# Patient Record
Sex: FEMALE | Race: WHITE | NOT HISPANIC OR LATINO | Employment: FULL TIME | URBAN - METROPOLITAN AREA
[De-identification: names, ages, dates, MRNs, and addresses within clinical notes are randomized per-mention and may not be internally consistent; named-entity substitution may affect disease eponyms.]

---

## 2017-06-22 ENCOUNTER — HOSPITAL ENCOUNTER (OUTPATIENT)
Dept: RADIOLOGY | Facility: MEDICAL CENTER | Age: 57
End: 2017-06-22
Attending: FAMILY MEDICINE
Payer: COMMERCIAL

## 2017-06-22 DIAGNOSIS — M81.0 AGE-RELATED OSTEOPOROSIS WITHOUT CURRENT PATHOLOGICAL FRACTURE: ICD-10-CM

## 2017-06-22 PROCEDURE — 77080 DXA BONE DENSITY AXIAL: CPT

## 2017-07-05 ENCOUNTER — HOSPITAL ENCOUNTER (OUTPATIENT)
Dept: RADIOLOGY | Facility: MEDICAL CENTER | Age: 57
End: 2017-07-05

## 2017-07-20 ENCOUNTER — HOSPITAL ENCOUNTER (OUTPATIENT)
Dept: RADIOLOGY | Facility: MEDICAL CENTER | Age: 57
End: 2017-07-20
Attending: OBSTETRICS & GYNECOLOGY
Payer: COMMERCIAL

## 2017-07-20 DIAGNOSIS — Z12.31 SCREENING MAMMOGRAM, ENCOUNTER FOR: ICD-10-CM

## 2017-07-20 PROCEDURE — G0202 SCR MAMMO BI INCL CAD: HCPCS

## 2017-09-06 ENCOUNTER — NON-PROVIDER VISIT (OUTPATIENT)
Dept: URGENT CARE | Facility: CLINIC | Age: 57
End: 2017-09-06
Payer: COMMERCIAL

## 2017-09-06 ENCOUNTER — NON-PROVIDER VISIT (OUTPATIENT)
Dept: OCCUPATIONAL MEDICINE | Facility: CLINIC | Age: 57
End: 2017-09-06

## 2017-09-06 DIAGNOSIS — Z23 ENCOUNTER FOR IMMUNIZATION: ICD-10-CM

## 2017-09-06 PROCEDURE — 90686 IIV4 VACC NO PRSV 0.5 ML IM: CPT | Performed by: PREVENTIVE MEDICINE

## 2017-09-06 PROCEDURE — 90471 IMMUNIZATION ADMIN: CPT | Performed by: PREVENTIVE MEDICINE

## 2017-09-08 VITALS
OXYGEN SATURATION: 96 % | HEART RATE: 52 BPM | TEMPERATURE: 98.6 F | WEIGHT: 169.2 LBS | DIASTOLIC BLOOD PRESSURE: 62 MMHG | SYSTOLIC BLOOD PRESSURE: 138 MMHG

## 2017-09-08 NOTE — PROGRESS NOTES
Travel dates:10/7/17-10/21/17  Countries to be visited: [place], [place]  Reason for travel: pleasure    Rural travel: not likely  High altitude travel: yes    Accommodations:  Hotel: yes   Hostel:  Camping:  Cruise: yes  With family:  Other:    Vaccines in past 30 days? No  Sick today? No  Allergies: pcn    The screening intake form was reviewed with the traveler. Health risks associated with their travel plans have been reviewed and discussed with the traveler. The traveler has been provided with vaccine information statements for the vaccines that are recommended and given the opportunity to discuss risks and benefits of vaccination and or medications. The traveler has received education on the travel itinerary provided and on the following topics.    Personal safety precautions: Yes  Food and water precautions: Yes  Management of traveler's diarrhea: Yes  Mosquito/insect bite prevention:Yes  Animal bites/Rabies prevention: No  High altitude precautions: yes      RN comments:   Hep A vaccine recommended. Pt declined, and stated she will receive it elsewhere for insurance coverage.   Flu vaccine given through urgent Care            Physician consultation required: no

## 2018-07-17 ENCOUNTER — APPOINTMENT (RX ONLY)
Dept: URBAN - METROPOLITAN AREA CLINIC 4 | Facility: CLINIC | Age: 58
Setting detail: DERMATOLOGY
End: 2018-07-17

## 2018-07-17 DIAGNOSIS — L82.1 OTHER SEBORRHEIC KERATOSIS: ICD-10-CM

## 2018-07-17 DIAGNOSIS — L60.3 NAIL DYSTROPHY: ICD-10-CM

## 2018-07-17 DIAGNOSIS — L72.0 EPIDERMAL CYST: ICD-10-CM

## 2018-07-17 PROCEDURE — 99203 OFFICE O/P NEW LOW 30 MIN: CPT

## 2018-07-17 PROCEDURE — ? COUNSELING

## 2018-07-17 ASSESSMENT — LOCATION SIMPLE DESCRIPTION DERM
LOCATION SIMPLE: NOSE
LOCATION SIMPLE: LEFT CALF
LOCATION SIMPLE: LEFT RING FINGERNAIL

## 2018-07-17 ASSESSMENT — LOCATION DETAILED DESCRIPTION DERM
LOCATION DETAILED: LEFT RING FINGERNAIL
LOCATION DETAILED: LEFT DISTAL MEDIAL CALF
LOCATION DETAILED: NASAL DORSUM

## 2018-07-17 ASSESSMENT — LOCATION ZONE DERM
LOCATION ZONE: FINGERNAIL
LOCATION ZONE: LEG
LOCATION ZONE: NOSE

## 2018-07-19 ENCOUNTER — HOSPITAL ENCOUNTER (OUTPATIENT)
Dept: RADIOLOGY | Facility: MEDICAL CENTER | Age: 58
End: 2018-07-19
Attending: OBSTETRICS & GYNECOLOGY
Payer: COMMERCIAL

## 2018-07-19 DIAGNOSIS — Z12.31 VISIT FOR SCREENING MAMMOGRAM: ICD-10-CM

## 2018-07-19 PROCEDURE — 77067 SCR MAMMO BI INCL CAD: CPT

## 2018-11-27 ENCOUNTER — APPOINTMENT (OUTPATIENT)
Dept: ADMISSIONS | Facility: MEDICAL CENTER | Age: 58
End: 2018-11-27
Attending: ORTHOPAEDIC SURGERY
Payer: COMMERCIAL

## 2018-11-27 RX ORDER — IBUPROFEN 200 MG
200 TABLET ORAL EVERY 6 HOURS PRN
COMMUNITY
End: 2019-02-05

## 2018-11-27 RX ORDER — ASCORBIC ACID 500 MG
500 TABLET ORAL DAILY
COMMUNITY
End: 2019-02-05

## 2018-11-29 ENCOUNTER — HOSPITAL ENCOUNTER (OUTPATIENT)
Facility: MEDICAL CENTER | Age: 58
End: 2018-11-29
Attending: ORTHOPAEDIC SURGERY | Admitting: ORTHOPAEDIC SURGERY
Payer: COMMERCIAL

## 2018-11-29 VITALS
OXYGEN SATURATION: 92 % | HEIGHT: 67 IN | DIASTOLIC BLOOD PRESSURE: 72 MMHG | HEART RATE: 80 BPM | BODY MASS INDEX: 24.01 KG/M2 | WEIGHT: 153 LBS | TEMPERATURE: 97.2 F | SYSTOLIC BLOOD PRESSURE: 126 MMHG | RESPIRATION RATE: 14 BRPM

## 2018-11-29 PROCEDURE — 160046 HCHG PACU - 1ST 60 MINS PHASE II: Performed by: ORTHOPAEDIC SURGERY

## 2018-11-29 PROCEDURE — 501838 HCHG SUTURE GENERAL: Performed by: ORTHOPAEDIC SURGERY

## 2018-11-29 PROCEDURE — 160029 HCHG SURGERY MINUTES - 1ST 30 MINS LEVEL 4: Performed by: ORTHOPAEDIC SURGERY

## 2018-11-29 PROCEDURE — 160048 HCHG OR STATISTICAL LEVEL 1-5: Performed by: ORTHOPAEDIC SURGERY

## 2018-11-29 PROCEDURE — A9270 NON-COVERED ITEM OR SERVICE: HCPCS | Performed by: ANESTHESIOLOGY

## 2018-11-29 PROCEDURE — 700101 HCHG RX REV CODE 250

## 2018-11-29 PROCEDURE — 502000 HCHG MISC OR IMPLANTS RC 0278: Performed by: ORTHOPAEDIC SURGERY

## 2018-11-29 PROCEDURE — 700111 HCHG RX REV CODE 636 W/ 250 OVERRIDE (IP)

## 2018-11-29 PROCEDURE — 500144 HCHG CANNULA KIT, UNIV GREY-SHOULDER: Performed by: ORTHOPAEDIC SURGERY

## 2018-11-29 PROCEDURE — 160002 HCHG RECOVERY MINUTES (STAT): Performed by: ORTHOPAEDIC SURGERY

## 2018-11-29 PROCEDURE — 160025 RECOVERY II MINUTES (STATS): Performed by: ORTHOPAEDIC SURGERY

## 2018-11-29 PROCEDURE — 700111 HCHG RX REV CODE 636 W/ 250 OVERRIDE (IP): Performed by: ANESTHESIOLOGY

## 2018-11-29 PROCEDURE — 160009 HCHG ANES TIME/MIN: Performed by: ORTHOPAEDIC SURGERY

## 2018-11-29 PROCEDURE — 160041 HCHG SURGERY MINUTES - EA ADDL 1 MIN LEVEL 4: Performed by: ORTHOPAEDIC SURGERY

## 2018-11-29 PROCEDURE — 500151 HCHG CANNULA, THRDED 8.4: Performed by: ORTHOPAEDIC SURGERY

## 2018-11-29 PROCEDURE — 501835 HCHG SUTURE PLASTIC: Performed by: ORTHOPAEDIC SURGERY

## 2018-11-29 PROCEDURE — 160022 HCHG BLOCK: Performed by: ORTHOPAEDIC SURGERY

## 2018-11-29 PROCEDURE — 502581 HCHG PACK, SHOULDER ARTHROSCOPY: Performed by: ORTHOPAEDIC SURGERY

## 2018-11-29 PROCEDURE — 160035 HCHG PACU - 1ST 60 MINS PHASE I: Performed by: ORTHOPAEDIC SURGERY

## 2018-11-29 PROCEDURE — 160036 HCHG PACU - EA ADDL 30 MINS PHASE I: Performed by: ORTHOPAEDIC SURGERY

## 2018-11-29 PROCEDURE — 500028 HCHG ARTHROWAND TURBOVAC 3.5/90 SUCT.: Performed by: ORTHOPAEDIC SURGERY

## 2018-11-29 PROCEDURE — 700102 HCHG RX REV CODE 250 W/ 637 OVERRIDE(OP): Performed by: ANESTHESIOLOGY

## 2018-11-29 DEVICE — IMPLANTABLE DEVICE: Type: IMPLANTABLE DEVICE | Site: SHOULDER | Status: FUNCTIONAL

## 2018-11-29 RX ORDER — OXYCODONE HCL 5 MG/5 ML
5 SOLUTION, ORAL ORAL
Status: COMPLETED | OUTPATIENT
Start: 2018-11-29 | End: 2018-11-29

## 2018-11-29 RX ORDER — OXYCODONE HCL 5 MG/5 ML
10 SOLUTION, ORAL ORAL
Status: COMPLETED | OUTPATIENT
Start: 2018-11-29 | End: 2018-11-29

## 2018-11-29 RX ORDER — HYDROMORPHONE HYDROCHLORIDE 1 MG/ML
0.2 INJECTION, SOLUTION INTRAMUSCULAR; INTRAVENOUS; SUBCUTANEOUS
Status: DISCONTINUED | OUTPATIENT
Start: 2018-11-29 | End: 2018-11-29 | Stop reason: HOSPADM

## 2018-11-29 RX ORDER — HYDROMORPHONE HYDROCHLORIDE 1 MG/ML
0.1 INJECTION, SOLUTION INTRAMUSCULAR; INTRAVENOUS; SUBCUTANEOUS
Status: DISCONTINUED | OUTPATIENT
Start: 2018-11-29 | End: 2018-11-29 | Stop reason: HOSPADM

## 2018-11-29 RX ORDER — HYDROMORPHONE HYDROCHLORIDE 1 MG/ML
0.4 INJECTION, SOLUTION INTRAMUSCULAR; INTRAVENOUS; SUBCUTANEOUS
Status: DISCONTINUED | OUTPATIENT
Start: 2018-11-29 | End: 2018-11-29 | Stop reason: HOSPADM

## 2018-11-29 RX ORDER — SODIUM CHLORIDE, SODIUM LACTATE, POTASSIUM CHLORIDE, CALCIUM CHLORIDE 600; 310; 30; 20 MG/100ML; MG/100ML; MG/100ML; MG/100ML
INJECTION, SOLUTION INTRAVENOUS ONCE
Status: COMPLETED | OUTPATIENT
Start: 2018-11-29 | End: 2018-11-29

## 2018-11-29 RX ORDER — LIDOCAINE HYDROCHLORIDE AND EPINEPHRINE 10; 10 MG/ML; UG/ML
INJECTION, SOLUTION INFILTRATION; PERINEURAL
Status: DISCONTINUED | OUTPATIENT
Start: 2018-11-29 | End: 2018-11-29 | Stop reason: HOSPADM

## 2018-11-29 RX ORDER — MEPERIDINE HYDROCHLORIDE 25 MG/ML
12.5 INJECTION INTRAMUSCULAR; INTRAVENOUS; SUBCUTANEOUS
Status: DISCONTINUED | OUTPATIENT
Start: 2018-11-29 | End: 2018-11-29 | Stop reason: HOSPADM

## 2018-11-29 RX ORDER — HALOPERIDOL 5 MG/ML
1 INJECTION INTRAMUSCULAR
Status: DISCONTINUED | OUTPATIENT
Start: 2018-11-29 | End: 2018-11-29 | Stop reason: HOSPADM

## 2018-11-29 RX ORDER — DIPHENHYDRAMINE HYDROCHLORIDE 50 MG/ML
12.5 INJECTION INTRAMUSCULAR; INTRAVENOUS
Status: DISCONTINUED | OUTPATIENT
Start: 2018-11-29 | End: 2018-11-29 | Stop reason: HOSPADM

## 2018-11-29 RX ORDER — LORAZEPAM 2 MG/ML
0.5 INJECTION INTRAMUSCULAR
Status: DISCONTINUED | OUTPATIENT
Start: 2018-11-29 | End: 2018-11-29 | Stop reason: HOSPADM

## 2018-11-29 RX ORDER — EPINEPHRINE 1 MG/ML(1)
AMPUL (ML) INJECTION
Status: DISCONTINUED | OUTPATIENT
Start: 2018-11-29 | End: 2018-11-29 | Stop reason: HOSPADM

## 2018-11-29 RX ORDER — ACETAMINOPHEN 500 MG
1000 TABLET ORAL ONCE
Status: COMPLETED | OUTPATIENT
Start: 2018-11-29 | End: 2018-11-29

## 2018-11-29 RX ORDER — OXYCODONE HYDROCHLORIDE 5 MG/1
5 TABLET ORAL
Status: DISCONTINUED | OUTPATIENT
Start: 2018-11-29 | End: 2018-11-29 | Stop reason: HOSPADM

## 2018-11-29 RX ORDER — GABAPENTIN 300 MG/1
300 CAPSULE ORAL ONCE
Status: COMPLETED | OUTPATIENT
Start: 2018-11-29 | End: 2018-11-29

## 2018-11-29 RX ORDER — ONDANSETRON 2 MG/ML
4 INJECTION INTRAMUSCULAR; INTRAVENOUS
Status: DISCONTINUED | OUTPATIENT
Start: 2018-11-29 | End: 2018-11-29 | Stop reason: HOSPADM

## 2018-11-29 RX ORDER — OXYCODONE HYDROCHLORIDE 10 MG/1
10 TABLET ORAL
Status: DISCONTINUED | OUTPATIENT
Start: 2018-11-29 | End: 2018-11-29 | Stop reason: HOSPADM

## 2018-11-29 RX ORDER — HYDRALAZINE HYDROCHLORIDE 20 MG/ML
5 INJECTION INTRAMUSCULAR; INTRAVENOUS
Status: DISCONTINUED | OUTPATIENT
Start: 2018-11-29 | End: 2018-11-29 | Stop reason: HOSPADM

## 2018-11-29 RX ADMIN — SODIUM CHLORIDE, SODIUM LACTATE, POTASSIUM CHLORIDE, CALCIUM CHLORIDE: 600; 310; 30; 20 INJECTION, SOLUTION INTRAVENOUS at 10:38

## 2018-11-29 RX ADMIN — GABAPENTIN 300 MG: 300 CAPSULE ORAL at 10:45

## 2018-11-29 RX ADMIN — Medication 5 MG: at 13:13

## 2018-11-29 RX ADMIN — ACETAMINOPHEN 1000 MG: 500 TABLET, COATED ORAL at 10:45

## 2018-11-29 ASSESSMENT — PAIN SCALES - GENERAL
PAINLEVEL_OUTOF10: 4
PAINLEVEL_OUTOF10: ASSUMED PAIN PRESENT
PAINLEVEL_OUTOF10: 4
PAINLEVEL_OUTOF10: 3
PAINLEVEL_OUTOF10: 3
PAINLEVEL_OUTOF10: 6

## 2018-11-29 NOTE — OR NURSING
1244  Received from or  resp spont r shoulder dressing c/d/i  Ice pack applied  Good movement  Fingers warm  Cap refill<3 sec  Brace in [id number] awake  resp spont  3/10 pain  No [id number] pain tolerable  Meets discharge criteria

## 2018-11-29 NOTE — DISCHARGE INSTRUCTIONS
ACTIVITY: Rest and take it easy for the first 24 hours.  A responsible adult is recommended to remain with you during that time.  It is normal to feel sleepy.  We encourage you to not do anything that requires balance, judgment or coordination.    MILD FLU-LIKE SYMPTOMS ARE NORMAL. YOU MAY EXPERIENCE GENERALIZED MUSCLE ACHES, THROAT IRRITATION, HEADACHE AND/OR SOME NAUSEA.    FOR 24 HOURS DO NOT:  Drive, operate machinery or run household appliances.  Drink beer or alcoholic beverages.   Make important decisions or sign legal documents.    SPECIAL INSTRUCTIONS: Follow Dr Sparks  Post-op instruction sheet for shoulders    DIET: To avoid nausea, slowly advance diet as tolerated, avoiding spicy or greasy foods for the first day.  Add more substantial food to your diet according to your physician's instructions.  Babies can be fed formula or breast milk as soon as they are hungry.  INCREASE FLUIDS AND FIBER TO AVOID CONSTIPATION.    SURGICAL DRESSING/BATHING:     FOLLOW-UP APPOINTMENT:  A follow-up appointment should be arranged with your doctor in ***; call to schedule.    You should CALL YOUR PHYSICIAN if you develop:  Fever greater than 101 degrees F.  Pain not relieved by medication, or persistent nausea or vomiting.  Excessive bleeding (blood soaking through dressing) or unexpected drainage from the wound.  Extreme redness or swelling around the incision site, drainage of pus or foul smelling drainage.  Inability to urinate or empty your bladder within 8 hours.  Problems with breathing or chest pain.    You should call [phone] if you develop problems with breathing or chest pain.  If you are unable to contact your doctor or surgical center, you should go to the nearest emergency room or urgent care center.  Physician's telephone #: [phone]    If any questions arise, call your doctor.  If your doctor is not available, please feel free to call the Surgical Center at [phone].  The Center is open Monday  through Friday from 7AM to 7PM.  You can also call the HEALTH HOTLINE open 24 hours/day, 7 days/week and speak to a nurse at [phone], or toll free at [phone].    A registered nurse may call you a few days after your surgery to see how you are doing after your procedure.    MEDICATIONS: Resume taking daily medication.  Take prescribed pain medication with food.  If no medication is prescribed, you may take non-aspirin pain medication if needed.  PAIN MEDICATION CAN BE VERY CONSTIPATING.  Take a stool softener or laxative such as senokot, pericolace, or milk of magnesia if needed.    Prescription at home.  Last pain medication given at 1:15    If your physician has prescribed pain medication that includes Acetaminophen (Tylenol), do not take additional Acetaminophen (Tylenol) while taking the prescribed medication.    Depression / Suicide Risk    As you are discharged from this [facility] facility, it is important to learn how to keep safe from harming yourself.    Recognize the warning signs:  · Abrupt changes in personality, positive or negative- including increase in energy   · Giving away possessions  · Change in eating patterns- significant weight changes-  positive or negative  · Change in sleeping patterns- unable to sleep or sleeping all the time   · Unwillingness or inability to communicate  · Depression  · Unusual sadness, discouragement and loneliness  · Talk of wanting to die  · Neglect of personal appearance   · Rebelliousness- reckless behavior  · Withdrawal from people/activities they love  · Confusion- inability to concentrate     If you or a loved one observes any of these behaviors or has concerns about self-harm, here's what you can do:  · Talk about it- your feelings and reasons for harming yourself  · Remove any means that you might use to hurt yourself (examples: pills, rope, extension cords, firearm)  · Get professional help from the community (Mental Health, Substance Abuse,  "psychological counseling)  · Do not be alone:Call your Safe Contact- someone whom you trust who will be there for you.  · Call your local CRISIS HOTLINE 209-2705 or 576-286-2214  · Call your local Children's Mobile Crisis Response Team [place] (770) 381-0095 or www.[employer]  · Call the toll free National Suicide Prevention Hotlines   · National Suicide Prevention Lifeline 357-385-BPJN (5737)  [facility] 800-SUICIDE (289-1818)    Peripheral Nerve Block Discharge Instructions from Same Day Surgery and Inpatient :    · f  What to Expect - Upper Extremity  · You may experience numbness and weakness in {ARM LOCATION PNB:089008}  on the same side as your surgery  · This is normal. For some people, this may be an unpleasant sensation. Be very careful with your numb limb  · Ask for help when you need it  Shoulder Surgery Side Effects  · In addition to numbness and weakness you may experience other symptoms  · Other nerves that are close to those nerves injected can also be affected by local anesthesia  · You may experience a hoarseness in your voice  · Your breathing may feel different  · You may also notice drooping of your eyelid, pupil constriction, and decreased sweating, on the side of your surgery  · All of these side effects are normal and will resolve when the local anesthetic wears off   Prevent Injury  · Protect the limb like a baby  · Beware of exposing your limb to extreme heat or cold or trauma  · The limb may be injured without you noticing because it is numb  · Keep the limb elevated whenever possible  · Do not sleep on the limb  · Change the position of the limb regularly  · Avoid putting pressure on your surgical limb  Pain Control  · The initial block on the day of surgery will make your extremity feel \"numb\"  · Any consecutive injection including prior to discharge from the hospital will make your extremity feel \"numb\"  · You may feel an aching or burning when the local " anesthesia starts to wear off  · Take pain pills as prescribed by your surgeon  · Call your surgeon or anesthesiologist if you do not have adequate pain control  ·

## 2018-11-29 NOTE — OP REPORT
DATE OF SERVICE:  11/29/2018    SURGEON:  William Rodriguez MD    ASSISTANT:  Falguni Kamara PA-C    ANESTHESIOLOGIST:  Ita Christie MD    ANESTHESIA:  General anesthesia with single shot interscalene block.    PREOPERATIVE DIAGNOSES:  1.  Right shoulder full-thickness rotator cuff tear.  2.  Right shoulder subacromial impingement bursitis.  3.  Right shoulder biceps tenosynovitis and intraarticular tearing.  4.  Right shoulder labrum/superior labral anterior to posterior tear.  5.  Right shoulder synovitis.    POSTOPERATIVE DIAGNOSES:  1.  Right shoulder full-thickness rotator cuff tear.  2.  Right shoulder subacromial impingement bursitis.  3.  Right shoulder biceps tenosynovitis and intraarticular tearing.  4.  Right shoulder labrum/superior labral anterior to posterior tear.  5.  Right shoulder synovitis.    PROCEDURES PERFORMED:  1.  Right shoulder arthroscopy.  2.  Rectal labral debridement/extensive debridement.  3.  Right shoulder synovectomy.  4.  Right shoulder biceps tenotomy and open subpectoral tenodesis.  5.  Right shoulder subacromial decompression.  6.  Right shoulder rotator cuff repair with bovine graft soft tissue   augmentation.    INDICATIONS:  Treat right shoulder injury, improve pain, improve function.    IMPLANTS:  1.  Smith and Nephew 5.5 mm triple loaded Healicoil anchor x2.  2.  Calderon and Nephew 2.8 mm Q-Fix anchor x1.  3.  Large Regeneten bovine graft.    HISTORY OF PRESENT ILLNESS:  The patient is a very pleasant and active   58-year-old female with progressively worsening right shoulder pain.  She   underwent conservative management and an MRI demonstrated a complete tear of   the rotator cuff.  We discussed surgical intervention.  The patient has been   n.p.o. since midnight.  She is medically cleared for surgery by the anesthesia   team.    INFORMED CONSENT:   The patient was informed of the risks, benefits,   alternatives of planned operation.  The risks include but not  limited to   bleeding, infection, neurovascular damage, recurrent rotator cuff tear,   osteoarthritis/cartilage damage, pain, stiffness, DVT, PE, MI, stroke, and   death.  Advanced directives were reviewed.  After answering all questions, the   patient agreed to proceed with planned operation and informed consent form   was signed.    DESCRIPTION OF PROCEDURE:  The patient was identified in the preoperative   holding area.  The correct procedural side and site were identified and   marked.  The patient was then brought to the operating room and transferred to   the operating room table.  She received a single shot interscalene block by   the anesthesia team followed by general anesthesia.    Examination of the right shoulder demonstrated no overlying skin lesions,   abrasions, or lacerations.  Passive forward flexion, abduction was to 160.    With the arm at the side, passive external rotation was to about 70 degrees.    With the arm at 90 degrees of abduction, passive external rotation was to 95   degrees and passive internal rotation was to 25 degrees.    The patient was then carefully placed in the beachchair position with all bony   prominences being well padded.  The right shoulder was then cleaned with   several alcohol soaked gauzes and the subacromial space injected with 30 mL of   1% lidocaine with epinephrine.  The right upper extremity was then prepped   and draped in a normal standard sterile fashion.    A procedural pause was performed by the operating team.  The procedure, the   patient's identity, operative side, surgical site, and the procedure were all   verified.  The patient was given IV antibiotics prior to incision.    I then began with a diagnostic arthroscopy via standard posterior and anterior   portals.  There was some diffuse synovitis of the glenohumeral joint.  There   was extensive tearing of the posterior, superior and anterior labrum with   extension into the long head of the biceps  tendon.  No obvious loose bodies or   soft tissue debris within the inferior axillary recess.  There were some   intermittent grade II and III changes over the superior aspect of the glenoid   and just some diffuse grade II changes over the humeral head.  The   subscapularis appeared to be intact at its insertion on the lesser tuberosity.    Examination of the articular side of the rotator cuff did demonstrate a   full-thickness tear of the supraspinatus.  Examination of the subacromial   space demonstrated an extensive inflammation and synovitis of the subacromial   bursa.  There was a prominent anterolateral acromial spur.  Examination of the   bursal side of the rotator cuff did demonstrate once again a full-thickness   rotator cuff tear.  The tear measured about 1.2 cm in the anterior/posterior   direction and a U shape.  The surrounding tendon was actually somewhat thin   and of poor quality.    I first turned my attention to the synovitis of the glenohumeral joint.  It   was debrided with the use of the oscillating suction shaver device and   electrocautery wand.  I then performed an extensive debridement of the   posterior, superior and anterior labrum.  A tenotomy of the biceps was then   performed at its insertion on the superior labrum and the tendon was allowed   to retract distally out of the joint.  The remaining stump of tissue was then   debrided.    All instruments were then removed.  A 2.5 cm longitudinal incision was made   over the anteromedial aspect of the proximal humerus.  The incision was   carried down to the subcutaneous tissue and the overlying fascia was incised.    The biceps tendon was identified and pulled out of the wound.  The proximal   tendon was excised leaving about 1.5 cm of tendon intact.  A single 2.8 mm   Smith and Nephew Q-Fix anchor was then placed onto the anterior aspect of the   proximal humerus immediately posterior to the pectoralis major tendon.  The   tendon was then  secured resulting in excellent fixation and recreation of   normal length-tension relationship.  The wound was then copiously irrigated   and meticulous hemostasis obtained.  All gloves were then changed.    The trocar and camera were then placed back into the posterior portal into the   subacromial space followed by a 30-degree scope.  I then created an accessory   portal off the lateral aspect of the acromion under direct visualization.  A   full bursectomy was then performed.  The undersurface of acromion identified   and anterolateral acromioplasty was performed utilizing a cutting block   technique.    I first turned my attention back to the rotator cuff.  I first debrided the   tuberosity of all remaining soft tissue and utilized the arthroscopic bur to   create a healthy bed of bleeding bone.  I then created a more anterior lateral   portal and placed an 8.25 mm cannula.  Given the U-shaped pattern of the   tear, I first proceeded with a margin convergence placing a single #2 suture   to convert the tear to a crescent type shape.  I then percutaneously placed   two 5.5 mm triple loaded Healicoil Calderon and Nephew anchors onto the medial   aspect of the tuberosity immediately adjacent to the articular cartilage.  The   sutures were then passed through the rotator cuff just lateral to the   myotendinous junction.  I then punched additional hole in the lateral   tuberosity with a microfracture awl in hopes of improving biological healing,   utilizing a Crimson-Duvet technique.  The sutures were then tied resulting in   excellent fixation of the rotator cuff back to its footprint on the   tuberosity.  Given the somewhat degenerative and thin nature of the rotator   cuff tissue, I then elected to augment with a Regeneten bovine soft tissue   graft.  The large graft was carefully introduced into the joint and deployed   over the rotator cuff repair.  I then placed 7 tendon-to-tendon staples around   the anterior  medial and posterior parts of the graft.  I then placed an   additional 2 tendon-to-bone staples laterally to secure it over the greater   tuberosity footprint.  Probing of the rotator cuff repair and the soft tissue   graft augmentation demonstrated it was stable.  The oscillating suction device   was then placed back into the glenohumeral joint and subacromial space to   remove any soft tissue or bony debris.  Meticulous hemostasis obtained.  All   instruments were then removed.    All incisions were then copiously irrigated.  The portal incisions were closed   with simple 3-0 Prolene suture followed by Steri-Strips.  The tenodesis wound   was then closed in a layered fashion with interrupted 2-0 Monocryl, followed   by running 3-0 Monocryl and Steri-Strips.  Xeroform was placed over all   incisions, followed by a sterile compressive dressing.  The patient was then   placed in a well-padded shoulder abduction sling.    Needle and sponge counts were correct at the end of the procedure as reported   by the circulating nurse.  The patient tolerated the procedure well with no   complications.  Patient was then transferred off the operating room table on   the regular hospital bed.  She was extubated by the anesthesia team.    ESTIMATED BLOOD LOSS:  5 mL.    COMPLICATIONS:  None.    TOURNIQUET TIME:  None.    SPECIMENS:  None.    WOUND TYPE:  Type 1, clean.    POSTOPERATIVE PLAN:  The patient will be transferred back to the postoperative   unit.  I expect she will be discharged from the hospital later this afternoon   once mobilizing safely and tolerating all medications.  She will remain   nonweightbearing to the right upper extremity for the next 6 weeks.  We will   begin physical therapy at that time.       ____________________________________     MD JORGE A Golden / [facility]    DD:  11/29/2018 12:52:19  DT:  11/29/2018 13:46:31    D#:  [id number]  Job#:  [id number]

## 2019-02-05 ENCOUNTER — APPOINTMENT (OUTPATIENT)
Dept: RADIOLOGY | Facility: MEDICAL CENTER | Age: 59
End: 2019-02-05
Attending: EMERGENCY MEDICINE
Payer: COMMERCIAL

## 2019-02-05 ENCOUNTER — HOSPITAL ENCOUNTER (EMERGENCY)
Facility: MEDICAL CENTER | Age: 59
End: 2019-02-05
Attending: EMERGENCY MEDICINE
Payer: COMMERCIAL

## 2019-02-05 VITALS
SYSTOLIC BLOOD PRESSURE: 141 MMHG | OXYGEN SATURATION: 92 % | HEIGHT: 67 IN | HEART RATE: 99 BPM | DIASTOLIC BLOOD PRESSURE: 73 MMHG | TEMPERATURE: 97.7 F | WEIGHT: 147.71 LBS | BODY MASS INDEX: 23.18 KG/M2 | RESPIRATION RATE: 16 BRPM

## 2019-02-05 DIAGNOSIS — S52.022A CLOSED FRACTURE OF OLECRANON PROCESS OF LEFT ULNA, INITIAL ENCOUNTER: ICD-10-CM

## 2019-02-05 PROCEDURE — 700102 HCHG RX REV CODE 250 W/ 637 OVERRIDE(OP): Performed by: EMERGENCY MEDICINE

## 2019-02-05 PROCEDURE — 70450 CT HEAD/BRAIN W/O DYE: CPT

## 2019-02-05 PROCEDURE — 99284 EMERGENCY DEPT VISIT MOD MDM: CPT

## 2019-02-05 PROCEDURE — A9270 NON-COVERED ITEM OR SERVICE: HCPCS | Performed by: EMERGENCY MEDICINE

## 2019-02-05 PROCEDURE — 73070 X-RAY EXAM OF ELBOW: CPT | Mod: LT

## 2019-02-05 PROCEDURE — 700111 HCHG RX REV CODE 636 W/ 250 OVERRIDE (IP): Performed by: EMERGENCY MEDICINE

## 2019-02-05 PROCEDURE — 73060 X-RAY EXAM OF HUMERUS: CPT | Mod: LT

## 2019-02-05 PROCEDURE — 73090 X-RAY EXAM OF FOREARM: CPT | Mod: LT

## 2019-02-05 PROCEDURE — 304217 HCHG IRRIGATION SYSTEM

## 2019-02-05 RX ORDER — ONDANSETRON 2 MG/ML
4 INJECTION INTRAMUSCULAR; INTRAVENOUS ONCE
Status: DISCONTINUED | OUTPATIENT
Start: 2019-02-05 | End: 2019-02-06 | Stop reason: HOSPADM

## 2019-02-05 RX ORDER — OXYCODONE HYDROCHLORIDE AND ACETAMINOPHEN 5; 325 MG/1; MG/1
1 TABLET ORAL EVERY 4 HOURS PRN
Qty: 15 TAB | Refills: 0 | Status: SHIPPED | OUTPATIENT
Start: 2019-02-05 | End: 2019-02-08

## 2019-02-05 RX ORDER — ONDANSETRON 4 MG/1
4 TABLET, ORALLY DISINTEGRATING ORAL EVERY 6 HOURS PRN
Qty: 10 TAB | Refills: 0 | Status: SHIPPED | OUTPATIENT
Start: 2019-02-05 | End: 2023-11-08

## 2019-02-05 RX ORDER — MORPHINE SULFATE 4 MG/ML
4 INJECTION, SOLUTION INTRAMUSCULAR; INTRAVENOUS ONCE
Status: DISCONTINUED | OUTPATIENT
Start: 2019-02-05 | End: 2019-02-06 | Stop reason: HOSPADM

## 2019-02-05 RX ORDER — OXYCODONE AND ACETAMINOPHEN 10; 325 MG/1; MG/1
1 TABLET ORAL ONCE
Status: COMPLETED | OUTPATIENT
Start: 2019-02-05 | End: 2019-02-05

## 2019-02-05 RX ORDER — ONDANSETRON 4 MG/1
4 TABLET, ORALLY DISINTEGRATING ORAL ONCE
Status: COMPLETED | OUTPATIENT
Start: 2019-02-05 | End: 2019-02-05

## 2019-02-05 RX ORDER — IBUPROFEN 200 MG
400 TABLET ORAL EVERY 6 HOURS PRN
COMMUNITY

## 2019-02-05 RX ADMIN — OXYCODONE HYDROCHLORIDE AND ACETAMINOPHEN 1 TABLET: 10; 325 TABLET ORAL at 21:10

## 2019-02-05 RX ADMIN — ONDANSETRON 4 MG: 4 TABLET, ORALLY DISINTEGRATING ORAL at 21:10

## 2019-02-05 ASSESSMENT — LIFESTYLE VARIABLES: DO YOU DRINK ALCOHOL: NO

## 2019-02-06 NOTE — ED NOTES
Med rec updated and complete.  Allergies reviewed.  Pt denies oral antibiotic in last 30 days at home. Pt reports no   Current prescription medications.

## 2019-02-06 NOTE — DISCHARGE INSTRUCTIONS
You were seen in the ER for elbow pain which is due to a fracture.  We have placed you in a splint and I have spoken with our on-call orthopedic surgeon.  He would like you to call his office tomorrow morning to make a follow-up appointment.  I have given you a prescription for Percocet for pain control, please take it only as directed.  Please do not take additional Tylenol as this medication has Tylenol in it.  Do not drink alcohol or drive after taking the medication as it will make you sleepy.  You can also take ibuprofen/Motrin/Advil for additional pain control.  Please return to the ER with any new or worsening symptoms.

## 2019-02-06 NOTE — ED NOTES
Pt discharged home. Assessment complete. Pt ambulates self. VS stable. Pt verbalized understanding discharge instructions. Prescriptions given pt verbalizes teaching provided. Narcotic consent signed and in chart.

## 2021-01-05 ENCOUNTER — HOSPITAL ENCOUNTER (OUTPATIENT)
Dept: LAB | Facility: MEDICAL CENTER | Age: 61
End: 2021-01-05
Attending: FAMILY MEDICINE
Payer: COMMERCIAL

## 2021-01-05 PROCEDURE — C9803 HOPD COVID-19 SPEC COLLECT: HCPCS

## 2021-01-05 PROCEDURE — U0005 INFEC AGEN DETEC AMPLI PROBE: HCPCS

## 2021-01-05 PROCEDURE — U0003 INFECTIOUS AGENT DETECTION BY NUCLEIC ACID (DNA OR RNA); SEVERE ACUTE RESPIRATORY SYNDROME CORONAVIRUS 2 (SARS-COV-2) (CORONAVIRUS DISEASE [COVID-19]), AMPLIFIED PROBE TECHNIQUE, MAKING USE OF HIGH THROUGHPUT TECHNOLOGIES AS DESCRIBED BY CMS-2020-01-R: HCPCS

## 2021-01-06 LAB
COVID ORDER STATUS COVID19: NORMAL
SARS-COV-2 RNA RESP QL NAA+PROBE: DETECTED
SPECIMEN SOURCE: ABNORMAL

## 2021-03-15 DIAGNOSIS — Z23 NEED FOR VACCINATION: ICD-10-CM

## 2021-03-23 ENCOUNTER — IMMUNIZATION (OUTPATIENT)
Dept: FAMILY PLANNING/WOMEN'S HEALTH CLINIC | Facility: IMMUNIZATION CENTER | Age: 61
End: 2021-03-23
Attending: INTERNAL MEDICINE
Payer: COMMERCIAL

## 2021-03-23 DIAGNOSIS — Z23 ENCOUNTER FOR VACCINATION: Primary | ICD-10-CM

## 2021-03-23 DIAGNOSIS — Z23 NEED FOR VACCINATION: ICD-10-CM

## 2021-03-23 PROCEDURE — 91300 PFIZER SARS-COV-2 VACCINE: CPT

## 2021-03-23 PROCEDURE — 0001A PFIZER SARS-COV-2 VACCINE: CPT

## 2021-04-16 ENCOUNTER — IMMUNIZATION (OUTPATIENT)
Dept: FAMILY PLANNING/WOMEN'S HEALTH CLINIC | Facility: IMMUNIZATION CENTER | Age: 61
End: 2021-04-16
Attending: INTERNAL MEDICINE
Payer: COMMERCIAL

## 2021-04-16 DIAGNOSIS — Z23 ENCOUNTER FOR VACCINATION: Primary | ICD-10-CM

## 2021-04-16 PROCEDURE — 91300 PFIZER SARS-COV-2 VACCINE: CPT

## 2021-04-16 PROCEDURE — 0002A PFIZER SARS-COV-2 VACCINE: CPT

## 2021-11-22 NOTE — ED TRIAGE NOTES
Chief Complaint   Patient presents with   • T-5000 GLF     slip and fall on ice. Fell backward. Denies LOC.   • Elbow Injury     left   • Head Laceration     occipital, bleeding controlled     BIB [facility] for above. CMS intact. Also c/o low back pain. In gown, VSS. Chart up for ERP.   
I was physically present and participated in this delivery.

## 2023-11-08 PROBLEM — S52.501A DISTAL RADIUS FRACTURE, RIGHT: Status: ACTIVE | Noted: 2023-11-08

## 2023-11-12 PROBLEM — R01.1 MURMUR: Status: ACTIVE | Noted: 2023-11-12

## 2023-11-28 NOTE — ED PROVIDER NOTES
ED Provider Note    Scribed for Rudy Mack M.D. by Tunde Mcnamara. 2/5/2019, 7:00 PM.    Primary care provider: Pcp Pt States None  Means of arrival: EMS  History obtained from: Patient  History limited by: None    CHIEF COMPLAINT  Chief Complaint   Patient presents with   • T-5000 GLF     slip and fall on ice. Fell backward. Denies LOC.   • Elbow Injury     left   • Head Laceration     occipital, bleeding controlled       HPI  Shima Meyers is a 58 y.o. right-hand-dominant female who presents to the Emergency Department after she slipped on ice and landed on her bottom, left elbow, and the back of her head earlier today.  She did not lose consciousness.  She is not on blood thinners.  Patient is now mainly complaining of left elbow pain and pain at the back of her scalp. She took motrin earlier today for right shoulder pain following a rotator cuff surgery in November, but has had no pain medication since the fall. She additionally denies any left wrist pain. She reports living alone and is up to date on her tetanus vaccination.     REVIEW OF SYSTEMS  Pertinent positives include left elbow pain. Pertinent negatives include no loss of consciousness, right shoulder pain, left wrist pain. As above, all other systems reviewed and are negative.   See HPI for further details.     PAST MEDICAL HISTORY   has a past medical history of Pain.    SURGICAL HISTORY   has a past surgical history that includes tonsillectomy (1963); orbital fracture orif (Left, 2008); shoulder decompression arthroscopic (Right, 11/29/2018); shoulder arthroscopy w/ rotator cuff repair (Right, 11/29/2018); and biceps tenodesis (Right, 11/29/2018).    SOCIAL HISTORY  Social History   Substance Use Topics   • Smoking status: Former Smoker     Packs/day: 1.00     Years: 20.00     Types: Cigarettes     Quit date: 1/1/2003   • Smokeless tobacco: Never Used   • Alcohol use Yes      Comment: 1 per day      History   Drug Use No       FAMILY  "HISTORY  None    CURRENT MEDICATIONS  Home Medications     Reviewed by Agueda Coley (Pharmacy Tech) on 02/05/19 at 2138  Med List Status: Complete   Medication Last Dose Status   ibuprofen (MOTRIN) 200 MG Tab 2/4/2019 Active                ALLERGIES  Allergies   Allergen Reactions   • Erythromycin Rash     .       PHYSICAL EXAM  VITAL SIGNS: /75   Pulse 84   Temp 36.5 °C (97.7 °F) (Temporal)   Resp 18   Ht 1.689 m (5' 6.5\")   Wt 74.8 kg (165 lb)   SpO2 97%   BMI 26.23 kg/m²   Vitals reviewed.  Constitutional: Alert in no apparent distress.  HENT: Small, superficial abrasion over the posterior scalp, Bilateral external ears normal, Nose normal.  Moist mucous membranes.  Eyes: Pupils are equal and reactive, Conjunctiva normal, Non-icteric.   Neck: Normal range of motion, No tenderness, Supple, No stridor.   Lymphatic: No lymphadenopathy noted.   Cardiovascular: Regular rate and rhythm, no murmurs. 2+ left radial pulse.  Thorax & Lungs: Normal breath sounds, No respiratory distress, No wheezing, No chest tenderness.   Abdomen: Bowel sounds normal, Soft, No tenderness, No peritoneal signs, No masses, No pulsatile masses.   Skin: Warm, Dry, No erythema, No rash.   Back: Normal alignment.  No cervical, thoracic, or lumbar spine tenderness to palpation.  Extremities: Intact distal pulses, edema surrounding left elbow with associated tenderness to palpation, No cyanosis  Musculoskeletal: Good range of motion in all major joints. No major deformities noted.   Neurologic: Alert, decreased ROM of left elbow 2/2 pain, Normal sensory function, No focal deficits noted.   Psychiatric: Affect normal, Judgment normal, Mood normal.     DIAGNOSTIC STUDIES / PROCEDURES      RADIOLOGY  DX-FOREARM LEFT   Final Result         1.  Comminuted olecranon fracture   2.  Small bony fragment adjacent to the tip the ulnar styloid, compatible with small ligamentous avulsion fracture fragment.      CT-HEAD W/O   Final Result       "   1.  No acute intracranial abnormality.   2.  Atherosclerosis.      DX-HUMERUS 2+ LEFT   Final Result         1.  Comminuted olecranon fracture.      DX-ELBOW-LIMITED 2- LEFT   Final Result         1.  Comminuted olecranon fracture.           The radiologist's interpretation of all radiological studies have been reviewed by me.    COURSE & MEDICAL DECISION MAKING  Nursing notes, VS, PMSFHx reviewed in chart.  Differential diagnoses include but not limited to: ICH, closed head injury, elbow/arm fracture     7:00 PM Patient seen and examined at bedside. Patient arrives afebrile with normal vital signs. Patient appears well hydrated and non-toxic. The physical exam is remarkable for left elbow swelling, tenderness, decreased ROM. Patient is neurovascularly intact in the left upper extremity. There is also a small abrasion over the posterior scalp with associated hemostasis. No suture/stapling required. Ordered for CT head, left humerus xray, left elbow xray, left forearm xray to evaluate. Patient will be treated with morphine 4mg, zofran 4mg for her symptoms.       Difficulty establishing IV access so PO pain meds were given with good relief of pain. CT head without acute abnormality. Xrays revealed comminuted left olecranon fracture. Discussed with radiologist that there is no dislocation.    8:49 PM Paged ortho.    9:13 PM Spoke with Dr. Whitehead, orthopedics, about the radiologist's interpretation of the xrays. Recommends to splint the patient in a position of comfort and have her follow up with him as an outpatient. Patient splinted. Given a prescription for percocet. Reminded not to take additional tylenol. Suggested additional pain control with ice and ibuprofen.    10:55 PM Patient reevaluated at bedside. Informed her of radiology results. She was advised to call Dr. Whitehead's office tomorrow. Shima understands and agrees to the discharge plan of care.    In prescribing controlled substances to this patient, I  certify that I have obtained and reviewed the medical history of Shima Meyers. I have also made a good donna effort to obtain applicable records from other providers who have treated the patient and records did not demonstrate any increased risk of substance abuse that would prevent me from prescribing controlled substances.     I have conducted a physical exam and documented it. I have reviewed Ms. Meyers’s prescription history as maintained by the [place] Prescription Monitoring Program.     I have assessed the patient’s risk for abuse, dependency, and addiction using the validated Opioid Risk Tool available at https://www.mdcalc.com/hvplbr-ekad-mloo-ort-narcotic-abuse.     Given the above, I believe the benefits of controlled substance therapy outweigh the risks. The reasons for prescribing controlled substances include in my professional opinion, controlled substances are the only reasonable choice for this patient because of the nature of the injury. Accordingly, I have discussed the risk and benefits, treatment plan, and alternative therapies with the patient.         The patient will return for new or worsening symptoms and is stable at the time of discharge.      DISPOSITION:  Patient will be discharged home in stable condition.    FOLLOW UP:  Edward Whitehead M.D.  [address] [facility] [address]  [phone]    Schedule an appointment as soon as possible for a visit in 1 day  for further workup      OUTPATIENT MEDICATIONS:  New Prescriptions    ONDANSETRON (ZOFRAN ODT) 4 MG TABLET DISPERSIBLE    Take 1 Tab by mouth every 6 hours as needed.    OXYCODONE-ACETAMINOPHEN (PERCOCET) 5-325 MG TAB    Take 1 Tab by mouth every four hours as needed for up to 3 days.         FINAL IMPRESSION  1. Closed fracture of olecranon process of left ulna, initial encounter          I, Tunde Mcnamara (Scribe), am scribing for, and in the presence of, Rudy Mack M.D..    Electronically signed by: Tunde DIAZ  Anders (Scribe), 2/5/2019    IRudy M.D. personally performed the services described in this documentation, as scribed by Tunde Mcnamara in my presence, and it is both accurate and complete. C.    The note accurately reflects work and decisions made by me.  Rudy Mack  2/6/2019  1:00 PM         Male

## (undated) DEVICE — GLOVE, LITE (PAIR)

## (undated) DEVICE — KIT ANESTHESIA W/CIRCUIT & 3/LT BAG W/FILTER (20EA/CA)

## (undated) DEVICE — MASK ANESTHESIA ADULT  - (100/CA)

## (undated) DEVICE — PACK SHOULDER ARTHROSCOPY SM - (2EA/CA)

## (undated) DEVICE — GOWN WARMING STANDARD FLEX - (30/CA)

## (undated) DEVICE — ARTHROWAND TURBOVAC 3.5/90 SCT

## (undated) DEVICE — KIT DISPOSABLE HIP 2.8MM IMPLANT INCLUDES DRILL DRILL GUIDE AND OBTURATOR

## (undated) DEVICE — SENSOR SPO2 NEO LNCS ADHESIVE (20/BX) SEE USER NOTES

## (undated) DEVICE — SUTURE 1 PDS-2 PLUS CTX - (24/BX)

## (undated) DEVICE — DRAPETIBURON SHOULDER W/POUCH - (5EA/CA)

## (undated) DEVICE — GLOVE BIOGEL SZ 8 SURGICAL PF LTX - (50PR/BX 4BX/CA)

## (undated) DEVICE — SUTURE PLASTIC

## (undated) DEVICE — SUCTION TIP STRAIGHT ARGYLE - 50EA/CA

## (undated) DEVICE — GUIDE TRACHE TUBE INTUBATING STYLET 5.0-10.0MM 14FR (20EA/PK)

## (undated) DEVICE — KIT ROOM DECONTAMINATION

## (undated) DEVICE — SUTURE 3-0 PROLENE PS-1 (12PK/BX)

## (undated) DEVICE — SUCTION INSTRUMENT YANKAUER BULBOUS TIP W/O VENT (50EA/CA)

## (undated) DEVICE — NEEDLE EXPRESSEW III (5EA/PK)

## (undated) DEVICE — TUBE E-T HI-LO CUFF 6.5MM (10EA/BX)

## (undated) DEVICE — SHAVER, 5.5 RESECTOR

## (undated) DEVICE — GLOVE BIOGEL SZ 7 SURGICAL PF LTX - (50PR/BX 4BX/CA)

## (undated) DEVICE — FOAM FACEHOLDER SPIDER (8EA/BX)

## (undated) DEVICE — GLOVE BIOGEL INDICATOR SZ 8 SURGICAL PF LTX - (50/BX 4BX/CA)

## (undated) DEVICE — LACTATED RINGERS INJ 1000 ML - (14EA/CA 60CA/PF)

## (undated) DEVICE — SUTURE 2-0 MONOCRYL PLUS UNDYED CT-1 1 X 36 (36EA/BX)"

## (undated) DEVICE — HUMID-VENT HEAT AND MOISTURE EXCHANGE- (50/BX)

## (undated) DEVICE — STERI STRIP COMPOUND BENZOIN - TINCTURE 0.6ML WITH APPLICATOR (40EA/BX)

## (undated) DEVICE — CANNULA KIT UNIV GREY - (10/BX)

## (undated) DEVICE — CANNULA TWIST IN 8.25MM X 7CM (5/BX)

## (undated) DEVICE — SYRINGE ASEPTO - (50EA/CA

## (undated) DEVICE — GLOVE BIOGEL INDICATOR SZ 7SURGICAL PF LTX - (50/BX 4BX/CA)

## (undated) DEVICE — ELECTRODE 850 FOAM ADHESIVE - HYDROGEL RADIOTRNSPRNT (50/PK)

## (undated) DEVICE — PROTECTOR ULNA NERVE - (36PR/CA)

## (undated) DEVICE — BLOCK

## (undated) DEVICE — NEPTUNE 4 PORT MANIFOLD - (20/PK)

## (undated) DEVICE — CHLORAPREP 26 ML APPLICATOR - ORANGE TINT(25/CA)

## (undated) DEVICE — TUBE CONNECTING SUCTION - CLEAR PLASTIC STERILE 72 IN (50EA/CA)

## (undated) DEVICE — CLOSURE SKIN STRIP 1/2 X 4 IN - (STERI STRIP) (50/BX 4BX/CA)

## (undated) DEVICE — ELECTRODE DUAL RETURN W/ CORD - (50/PK)

## (undated) DEVICE — BLADE SURGICAL #15 - (50/BX 3BX/CA)

## (undated) DEVICE — NEEDLE W/FACET TIP DULL VERSION W/STIMULATION CABLE SONOPLEX 21G X 4 (10/EA)"

## (undated) DEVICE — PENCIL ELECTSURG 10FT BTN SWH - (50/CA)

## (undated) DEVICE — SODIUM CHL. IRRIGATION 0.9% 3000ML (4EA/CA 65CA/PF)

## (undated) DEVICE — GLOVE BIOGEL PI ORTHO SZ 8 PF LF (40PR/BX)

## (undated) DEVICE — SPIDER SHOULDER HOLDER (12EA/BX)

## (undated) DEVICE — TUBING PATIENT W/CONNECTOR REDEUCE (1EA)

## (undated) DEVICE — SUTURE GENERAL

## (undated) DEVICE — SHAVER4.0 AGGRESSIVE + FORMLA (5EA/BX)